# Patient Record
Sex: MALE | Race: WHITE | URBAN - METROPOLITAN AREA
[De-identification: names, ages, dates, MRNs, and addresses within clinical notes are randomized per-mention and may not be internally consistent; named-entity substitution may affect disease eponyms.]

---

## 2018-09-13 ENCOUNTER — HOSPITAL ENCOUNTER (EMERGENCY)
Age: 38
Discharge: HOME | End: 2018-09-14
Attending: EMERGENCY MEDICINE
Payer: COMMERCIAL

## 2018-09-13 VITALS
DIASTOLIC BLOOD PRESSURE: 79 MMHG | SYSTOLIC BLOOD PRESSURE: 129 MMHG | TEMPERATURE: 98.4 F | HEART RATE: 82 BPM | RESPIRATION RATE: 18 BRPM | OXYGEN SATURATION: 100 %

## 2018-09-13 DIAGNOSIS — T40.1X1A ACCIDENTAL OVERDOSE OF HEROIN, INITIAL ENCOUNTER (HCC): Primary | ICD-10-CM

## 2018-09-13 PROCEDURE — 99284 EMERGENCY DEPT VISIT MOD MDM: CPT

## 2018-09-14 ASSESSMENT — ENCOUNTER SYMPTOMS
PHOTOPHOBIA: 0
SHORTNESS OF BREATH: 0
BACK PAIN: 0
RHINORRHEA: 0
COUGH: 0
NAUSEA: 0
VOMITING: 0

## 2018-09-14 NOTE — ED NOTES
Bed: 33  Expected date: 9/13/18  Expected time: 9:17 PM  Means of arrival: [employer]  Comments:  LISA Cortes RN  09/13/18 [id number]

## 2018-09-14 NOTE — ED PROVIDER NOTES
[facility] ED  eMERGENCY dEPARTMENT eNCOUnter   Attending Attestation     Pt Name: May Chao  MRN: [id number]  [place] 1980  Date of evaluation: 9/13/18       May Chao is a 34 y.o. male who presents with Drug Overdose      History: patient is with drug overdose. Patient states of $10 of white powder and . Patient said he has not taken any opiates and 26 months and actually overdosed today. History limited less and will not do this again. patient received Narcan in the field. Exam:heart rate and rhythm are regular. Lungs are clearbilaterally. Abdomen is soft, nontender. Patient is well-appearing. Patient has no complaints. Plan for 2 hours observation and discharge after this. I performed a history and physical examination of the patient and discussed management with the resident. I reviewed the residents note and agree with the documented findings and plan of care. Any areas of disagreement are noted on the chart. I was personally present for the key portions of any procedures. I have documented in the chart those procedures where I was not present during the key portions. I have personally reviewed all images and agree with the resident's interpretation. I have reviewed the emergency nurses triage note. I agree with the chief complaint, past medical history, past surgical history, allergies, medications, social and family history as documented unless otherwise noted below. Documentation of the HPI, Physical Exam and Medical Decision Making performed by medical students or scribes is based on my personal performance of the HPI, PE and MDM. For Phys Assistant/ Nurse Practitioner cases/documentation I have had a face to face evaluation of this patient and have completed at least one if not all key elements of the E/M (history, physical exam, and MDM). Additional findings are as noted.     For APC cases I have personally evaluated and examined the

## 2018-09-14 NOTE — ED PROVIDER NOTES
Dr Manzo [place] sign out, dx od plan to dc home,      Gabriel Prieto [place]  09/13/18 [id number]    vss pt discharged as per above plan       Gabriel Prieto DO  09/14/18 [id number]

## 2018-09-14 NOTE — ED NOTES
PT arrived to ED via EMS. PT has CO heroin overdose. PT states that he snorted a $10.00 bag. PT states that he woke up to EMS. PT received 4 mg narcan IN   PT received 2 mg narcan IV. PT has 20 g IV in the left hand by EMS. NAD noted. RR even and unlabored. PT Placed on pulse ox and blood pressure.        Lianet Blank RN  09/13/18 0125

## 2018-09-14 NOTE — ED PROVIDER NOTES
[facility]  ED  Emergency Department Encounter  Emergency Medicine Resident     Pt Name: Marianne Tamayo  MRN: [id number]  [place] 1980  Date of evaluation: 9/13/18  PCP:  No primary care provider on file. CHIEF COMPLAINT       Chief Complaint   Patient presents with    Drug Overdose       HISTORY OF PRESENT ILLNESS  (Location/Symptom, Timing/Onset, Context/Setting, Quality, Duration, Modifying Factors, Severity.)      Marianne Tamayo is a 34 y.o. male who presents with Drug overdose. Patient states that he has been clean for the past 2 years. He had an \"itch\" to use again. He states that he does not like $10 worth of what he thinks is heroin. He states that he was trying to go to the restroom to take a shower and the next thing he knew he woke up with \"a bunch of people surrounding me. \"  Patient received 6 mg total of Narcan prior to arrival.  No nausea or vomiting, patient has no current complaints and says \"I fell off the wagon, I feel really stupid. PAST MEDICAL / SURGICAL / SOCIAL / FAMILY HISTORY      has no past medical history    has no past surgical history     Social History     Social History    Marital status: Single     Spouse name: N/A    Number of children: N/A    Years of education: N/A     Occupational History    Not on file. Social History Main Topics    Smoking status: Current Every Day Smoker     Packs/day: 1.00    Smokeless tobacco: Never Used    Alcohol use No    Drug use: Yes     Types: Opiates       Comment: snorted today    Sexual activity: Not Currently     Other Topics Concern    Not on file     Social History Narrative    No narrative on file       History reviewed. No pertinent family history. Allergies:  Patient has no known allergies.     Home Medications:  Prior to Admission medications    Not on File       REVIEW OF SYSTEMS    (2-9 systems for level 4, 10 or more for level 5)      Review of Systems   Constitutional: Negative for chills and fever. HENT: Negative for congestion and rhinorrhea. Eyes: Negative for photophobia and visual disturbance. Respiratory: Negative for cough and shortness of breath. Cardiovascular: Negative for chest pain and palpitations. Gastrointestinal: Negative for nausea and vomiting. Genitourinary: Negative for decreased urine volume and urgency. Musculoskeletal: Negative for back pain and gait problem. Skin: Negative for rash and wound. PHYSICAL EXAM   (up to 7 for level 4, 8 or more for level 5)      INITIAL VITALS:   /79   Pulse 82   Temp 98.4 °F (36.9 °C) (Oral)   Resp 18   SpO2 100%     Physical Exam   Constitutional: He is oriented to person, place, and time. He appears well-developed and well-nourished. HENT:   Head: Normocephalic and atraumatic. Mouth/Throat: Oropharynx is clear and moist.   Eyes: Pupils are equal, round, and reactive to light. EOM are normal. Right eye exhibits no discharge. Left eye exhibits no discharge. Pupils are not pinpoint   Cardiovascular: Normal rate. No murmur heard. Pulmonary/Chest: No respiratory distress. He has no wheezes. Abdominal: Soft. Bowel sounds are normal. He exhibits no distension. There is no tenderness. Musculoskeletal: Normal range of motion. He exhibits no edema or tenderness. Neurological: He is alert and oriented to person, place, and time. Skin: Skin is warm. No rash noted. No erythema. Nursing note and vitals reviewed. DIFFERENTIAL  DIAGNOSIS     PLAN (LABS / IMAGING / EKG):  No orders of the defined types were placed in this encounter. MEDICATIONS ORDERED:  No orders of the defined types were placed in this encounter. DDX: Opioid overdose, substance abuse    DIAGNOSTIC RESULTS / EMERGENCY DEPARTMENT COURSE / [facility]     LABS:  No results found for this visit on 09/13/18. RADIOLOGY:  No results found.         MDM/EMERGENCY DEPARTMENT COURSE:  10:09 PM: 35 yo male with hx of opiate abuse

## 2018-09-14 NOTE — ED NOTES
Dr. Cleary [place] in to see PT. PT resting in bed. NAD noted. RR even and unlabored.        Mabel Mercedes RN  09/13/18 2458